# Patient Record
Sex: FEMALE | Race: BLACK OR AFRICAN AMERICAN | URBAN - METROPOLITAN AREA
[De-identification: names, ages, dates, MRNs, and addresses within clinical notes are randomized per-mention and may not be internally consistent; named-entity substitution may affect disease eponyms.]

---

## 2020-11-15 ENCOUNTER — VIRTUAL VISIT (OUTPATIENT)
Dept: FAMILY MEDICINE CLINIC | Age: 25
End: 2020-11-15

## 2020-11-15 DIAGNOSIS — Z20.822 CLOSE EXPOSURE TO COVID-19 VIRUS: ICD-10-CM

## 2020-11-15 PROBLEM — L30.9 ECZEMA: Status: ACTIVE | Noted: 2020-11-15

## 2020-11-15 PROCEDURE — 99202 OFFICE O/P NEW SF 15 MIN: CPT | Performed by: NURSE PRACTITIONER

## 2020-11-15 RX ORDER — NORELGESTROMIN AND ETHINYL ESTRADIOL 150; 35 UG/D; UG/D
1 PATCH TRANSDERMAL WEEKLY
COMMUNITY
Start: 2020-05-06

## 2020-11-15 RX ORDER — ALBUTEROL SULFATE 90 UG/1
2 AEROSOL, METERED RESPIRATORY (INHALATION) EVERY 4 HOURS PRN
COMMUNITY
Start: 2020-04-13

## 2020-11-15 RX ORDER — TRIAMCINOLONE ACETONIDE 1 MG/G
CREAM TOPICAL 2 TIMES DAILY
COMMUNITY
Start: 2020-04-15

## 2020-11-15 ASSESSMENT — ENCOUNTER SYMPTOMS
COUGH: 0
VOMITING: 1
EYE ITCHING: 0
SORE THROAT: 1
WHEEZING: 1
SHORTNESS OF BREATH: 0
EYE DISCHARGE: 0
EYE PAIN: 0
DIARRHEA: 1
SINUS PRESSURE: 0
EYE REDNESS: 0
SINUS PAIN: 0
RHINORRHEA: 0

## 2020-11-15 ASSESSMENT — PATIENT HEALTH QUESTIONNAIRE - PHQ9
2. FEELING DOWN, DEPRESSED OR HOPELESS: 0
SUM OF ALL RESPONSES TO PHQ9 QUESTIONS 1 & 2: 1
SUM OF ALL RESPONSES TO PHQ QUESTIONS 1-9: 1
SUM OF ALL RESPONSES TO PHQ QUESTIONS 1-9: 1
1. LITTLE INTEREST OR PLEASURE IN DOING THINGS: 1
SUM OF ALL RESPONSES TO PHQ QUESTIONS 1-9: 1

## 2020-11-15 NOTE — LETTER
[address] [address]  Phone: [phone]  Fax: [phone]    PREET Mckeon CNP        November 15, 2020     Patient: Lubna Yi   YOB: 1995   Date of Visit: 11/15/2020       To Whom it May Concern:    Roslyn Reed was seen in my clinic on 11/15/2020. She was COVID-19 tested today and can not return to work until she has her results. If you have any questions or concerns, please don't hesitate to call.     Sincerely,         PREET Mckeon CNP

## 2020-11-15 NOTE — PROGRESS NOTES
11/15/2020    TELEHEALTH EVALUATION -- Audio/Visual (During DTJSA-43 public health emergency)    Due to COVID 19 outbreak, patient's office visit was converted to a virtual visit. Patient was contacted and agreed to proceed with a virtual visit via Telephone Visit appr 15 mins  The risks and benefits of converting to a virtual visit were discussed in light of the current infectious disease epidemic. Patient also understood that insurance coverage and co-pays are up to their individual insurance plans. HPI:    Arlene Matthews (:  1995) has requested an audio/video evaluation for the following concern(s):    COVID exposure   She states at work - [employer] in [place]- reports multiple cases there   She was walking out with a co-worker last week and found out he was positive- coworker was not showing symptoms at the time   she is having a mild sore throat and she did have vomiting and diarrhea that lasted 2 days last week   she didn't think much of it bc she has hx of getting gastroenteritis frequently   Taking PO fluids well , appetite is good now but does have a mild sore throat     Review of Systems   Constitutional: Positive for fatigue. Negative for appetite change, chills and fever. HENT: Positive for sore throat (mild). Negative for congestion, ear pain, postnasal drip, rhinorrhea, sinus pressure and sinus pain. Eyes: Negative for pain, discharge, redness and itching. Respiratory: Positive for wheezing (uses albuterol with relief). Negative for cough and shortness of breath. Cardiovascular: Negative for chest pain and palpitations. Gastrointestinal: Positive for diarrhea and vomiting. Musculoskeletal:        Normal to have neck and back pains   Skin: Positive for rash (eczema ). Allergic/Immunologic: Positive for environmental allergies. Negative for food allergies. Neurological: Negative for dizziness and headaches. Prior to Visit Medications    Medication Sig Taking?  Authorizing Provider   albuterol sulfate  (90 Base) MCG/ACT inhaler Inhale 2 puffs into the lungs every 4 hours as needed Yes Historical Provider, MD   norelgestromin-ethinyl estradiol Verlie Tariq) 150-35 MCG/24HR Place 1 patch onto the skin once a week Yes Historical Provider, MD   triamcinolone (KENALOG) 0.1 % cream Apply topically 2 times daily Yes Historical Provider, MD       Social History     Tobacco Use    Smoking status: Never Smoker    Smokeless tobacco: Never Used   Substance Use Topics    Alcohol use: Not on file    Drug use: Not on file        Allergies   Allergen Reactions    Acetaminophen Hives and Nausea And Vomiting   ,   Past Medical History:   Diagnosis Date    Asthma     Eczema    ,   Past Surgical History:   Procedure Laterality Date    CARPAL TUNNEL RELEASE Bilateral    ,   Social History     Tobacco Use    Smoking status: Never Smoker    Smokeless tobacco: Never Used   Substance Use Topics    Alcohol use: Not on file    Drug use: Not on file   ,   Family History   Problem Relation Age of Onset    No Known Problems Mother     No Known Problems Father    ,   There is no immunization history on file for this patient.,   Health Maintenance   Topic Date Due    Varicella vaccine (1 of 2 - 2-dose childhood series) 10/22/1995    HPV vaccine (1 - 2-dose series) 10/22/2005    HIV screen  10/22/2009    DTaP/Tdap/Td vaccine (1 - Tdap) 10/22/2013    Cervical cancer screen  10/22/2015    Flu vaccine (1) 09/01/2020    Hepatitis A vaccine  Aged Out    Hepatitis B vaccine  Aged Out    Hib vaccine  Aged Out    Meningococcal (ACWY) vaccine  Aged Out    Pneumococcal 0-64 years Vaccine  Aged Out       PHYSICAL EXAMINATION:  [ INSTRUCTIONS:  \"[x]\" Indicates a positive item  \"[]\" Indicates a negative item  -- DELETE ALL ITEMS NOT EXAMINED]  [x] Alert  [x] Oriented to person/place/time    [x] No apparent distress  [] Toxic appearing    [] Face flushed appearing [] Sclera clear  [] Lips are cyanotic

## 2020-11-18 LAB
SARS-COV-2: NOT DETECTED
SOURCE: NORMAL

## 2020-11-19 ENCOUNTER — TELEPHONE (OUTPATIENT)
Dept: FAMILY MEDICINE CLINIC | Age: 25
End: 2020-11-19